# Patient Record
Sex: FEMALE | Race: WHITE | Employment: UNEMPLOYED | RURAL
[De-identification: names, ages, dates, MRNs, and addresses within clinical notes are randomized per-mention and may not be internally consistent; named-entity substitution may affect disease eponyms.]

---

## 2018-06-15 ENCOUNTER — HOSPITAL ENCOUNTER (OUTPATIENT)
Dept: OTHER | Age: 38
Discharge: HOME | End: 2018-06-15
Attending: NURSE PRACTITIONER | Admitting: NURSE PRACTITIONER

## 2018-06-15 LAB
A/G RATIO: 1.8 (ref 0.8–2)
ALBUMIN SERPL-MCNC: 4.3 G/DL (ref 3.4–4.8)
ALP BLD-CCNC: 54 U/L (ref 25–100)
ALT SERPL-CCNC: 14 U/L (ref 4–36)
ANION GAP SERPL CALCULATED.3IONS-SCNC: 12 MMOL/L (ref 3–16)
AST SERPL-CCNC: 19 U/L (ref 8–33)
BASOPHILS ABSOLUTE: 0 K/UL (ref 0–0.1)
BASOPHILS RELATIVE PERCENT: 0.6 %
BILIRUB SERPL-MCNC: 0.4 MG/DL (ref 0.3–1.2)
BUN BLDV-MCNC: 8 MG/DL (ref 6–20)
CALCIUM SERPL-MCNC: 9.3 MG/DL (ref 8.5–10.5)
CHLORIDE BLD-SCNC: 104 MMOL/L (ref 98–107)
CHOLESTEROL, TOTAL: 159 MG/DL (ref 0–200)
CO2: 25 MMOL/L (ref 20–30)
CREAT SERPL-MCNC: 0.7 MG/DL (ref 0.4–1.2)
EOSINOPHILS ABSOLUTE: 0.2 K/UL (ref 0–0.4)
EOSINOPHILS RELATIVE PERCENT: 2.9 %
FOLATE: >20 NG/ML
GFR AFRICAN AMERICAN: >59
GFR NON-AFRICAN AMERICAN: >60
GLOBULIN: 2.4 G/DL
GLUCOSE BLD-MCNC: 104 MG/DL (ref 74–106)
HCT VFR BLD CALC: 43 % (ref 37–47)
HDLC SERPL-MCNC: 57 MG/DL (ref 40–60)
HEMOGLOBIN: 13.9 G/DL (ref 11.5–16.5)
IMMATURE GRANULOCYTES #: 0 K/UL
IMMATURE GRANULOCYTES %: 0.4 % (ref 0–5)
LDL CHOLESTEROL CALCULATED: 89 MG/DL
LYMPHOCYTES ABSOLUTE: 1.7 K/UL (ref 1.5–4)
LYMPHOCYTES RELATIVE PERCENT: 30.5 %
MCH RBC QN AUTO: 29.3 PG (ref 27–32)
MCHC RBC AUTO-ENTMCNC: 32.3 G/DL (ref 31–35)
MCV RBC AUTO: 90.7 FL (ref 80–100)
MONOCYTES ABSOLUTE: 0.3 K/UL (ref 0.2–0.8)
MONOCYTES RELATIVE PERCENT: 5.9 %
NEUTROPHILS ABSOLUTE: 3.3 K/UL (ref 2–7.5)
NEUTROPHILS RELATIVE PERCENT: 59.7 %
PDW BLD-RTO: 12.9 % (ref 11–16)
PLATELET # BLD: 235 K/UL (ref 150–400)
PMV BLD AUTO: 12.8 FL (ref 6–10)
POTASSIUM SERPL-SCNC: 4 MMOL/L (ref 3.4–5.1)
RBC # BLD: 4.74 M/UL (ref 3.8–5.8)
SODIUM BLD-SCNC: 141 MMOL/L (ref 136–145)
TOTAL PROTEIN: 6.7 G/DL (ref 6.4–8.3)
TRIGL SERPL-MCNC: 67 MG/DL (ref 0–249)
TSH SERPL DL<=0.05 MIU/L-ACNC: 2.55 UIU/ML (ref 0.35–5.5)
VITAMIN B-12: 617 PG/ML (ref 211–911)
VLDLC SERPL CALC-MCNC: 13 MG/DL
WBC # BLD: 5.4 K/UL (ref 4–11)

## 2019-07-26 ENCOUNTER — HOSPITAL ENCOUNTER (OUTPATIENT)
Facility: HOSPITAL | Age: 39
Discharge: HOME | End: 2019-07-26
Payer: COMMERCIAL

## 2019-07-26 LAB
A/G RATIO: 2.1 (ref 0.8–2)
ALBUMIN SERPL-MCNC: 4.5 G/DL (ref 3.4–4.8)
ALP BLD-CCNC: 51 U/L (ref 25–100)
ALT SERPL-CCNC: 13 U/L (ref 4–36)
ANION GAP SERPL CALCULATED.3IONS-SCNC: 10 MMOL/L (ref 3–16)
AST SERPL-CCNC: 18 U/L (ref 8–33)
BASOPHILS ABSOLUTE: 0 K/UL (ref 0–0.1)
BASOPHILS RELATIVE PERCENT: 0.8 %
BILIRUB SERPL-MCNC: 0.4 MG/DL (ref 0.3–1.2)
BUN BLDV-MCNC: 6 MG/DL (ref 6–20)
CALCIUM SERPL-MCNC: 9 MG/DL (ref 8.5–10.5)
CHLORIDE BLD-SCNC: 102 MMOL/L (ref 98–107)
CHOLESTEROL, TOTAL: 137 MG/DL (ref 0–200)
CO2: 27 MMOL/L (ref 20–30)
CREAT SERPL-MCNC: 0.6 MG/DL (ref 0.4–1.2)
EOSINOPHILS ABSOLUTE: 0.1 K/UL (ref 0–0.4)
EOSINOPHILS RELATIVE PERCENT: 2.7 %
FOLATE: 19.04 NG/ML
GFR AFRICAN AMERICAN: >59
GFR NON-AFRICAN AMERICAN: >60
GLOBULIN: 2.1 G/DL
GLUCOSE BLD-MCNC: 113 MG/DL (ref 74–106)
HCT VFR BLD CALC: 43.5 % (ref 37–47)
HDLC SERPL-MCNC: 60 MG/DL (ref 40–60)
HEMOGLOBIN: 14.3 G/DL (ref 11.5–16.5)
IMMATURE GRANULOCYTES #: 0 K/UL
IMMATURE GRANULOCYTES %: 0.4 % (ref 0–5)
LDL CHOLESTEROL CALCULATED: 64 MG/DL
LYMPHOCYTES ABSOLUTE: 1.5 K/UL (ref 1.5–4)
LYMPHOCYTES RELATIVE PERCENT: 28.9 %
MCH RBC QN AUTO: 30.1 PG (ref 27–32)
MCHC RBC AUTO-ENTMCNC: 32.9 G/DL (ref 31–35)
MCV RBC AUTO: 91.6 FL (ref 80–100)
MONOCYTES ABSOLUTE: 0.3 K/UL (ref 0.2–0.8)
MONOCYTES RELATIVE PERCENT: 6.6 %
NEUTROPHILS ABSOLUTE: 3.1 K/UL (ref 2–7.5)
NEUTROPHILS RELATIVE PERCENT: 60.6 %
PDW BLD-RTO: 12.6 % (ref 11–16)
PLATELET # BLD: 244 K/UL (ref 150–400)
PMV BLD AUTO: 12.3 FL (ref 6–10)
POTASSIUM SERPL-SCNC: 4.2 MMOL/L (ref 3.4–5.1)
RBC # BLD: 4.75 M/UL (ref 3.8–5.8)
SODIUM BLD-SCNC: 139 MMOL/L (ref 136–145)
TOTAL PROTEIN: 6.6 G/DL (ref 6.4–8.3)
TRIGL SERPL-MCNC: 64 MG/DL (ref 0–249)
TSH SERPL DL<=0.05 MIU/L-ACNC: 1.17 UIU/ML (ref 0.35–5.5)
VITAMIN B-12: 529 PG/ML (ref 211–911)
VLDLC SERPL CALC-MCNC: 13 MG/DL
WBC # BLD: 5.2 K/UL (ref 4–11)

## 2019-07-26 PROCEDURE — 80053 COMPREHEN METABOLIC PANEL: CPT

## 2019-07-26 PROCEDURE — 84443 ASSAY THYROID STIM HORMONE: CPT

## 2019-07-26 PROCEDURE — 82607 VITAMIN B-12: CPT

## 2019-07-26 PROCEDURE — 85025 COMPLETE CBC W/AUTO DIFF WBC: CPT

## 2019-07-26 PROCEDURE — 36415 COLL VENOUS BLD VENIPUNCTURE: CPT

## 2019-07-26 PROCEDURE — 82746 ASSAY OF FOLIC ACID SERUM: CPT

## 2019-07-26 PROCEDURE — 80061 LIPID PANEL: CPT

## 2020-09-01 ENCOUNTER — HOSPITAL ENCOUNTER (EMERGENCY)
Facility: HOSPITAL | Age: 40
Discharge: HOME | End: 2020-09-01
Attending: EMERGENCY MEDICINE
Payer: COMMERCIAL

## 2020-09-01 ENCOUNTER — APPOINTMENT (OUTPATIENT)
Dept: ULTRASOUND IMAGING | Facility: HOSPITAL | Age: 40
End: 2020-09-01
Payer: COMMERCIAL

## 2020-09-01 VITALS
WEIGHT: 130 LBS | OXYGEN SATURATION: 100 % | TEMPERATURE: 97.3 F | SYSTOLIC BLOOD PRESSURE: 117 MMHG | RESPIRATION RATE: 16 BRPM | HEART RATE: 97 BPM | DIASTOLIC BLOOD PRESSURE: 82 MMHG | BODY MASS INDEX: 20.4 KG/M2 | HEIGHT: 67 IN

## 2020-09-01 LAB
A/G RATIO: 1.1 (ref 0.8–2)
ALBUMIN SERPL-MCNC: 3.7 G/DL (ref 3.4–4.8)
ALP BLD-CCNC: 77 U/L (ref 25–100)
ALT SERPL-CCNC: 23 U/L (ref 4–36)
ANION GAP SERPL CALCULATED.3IONS-SCNC: 11 MMOL/L (ref 3–16)
AST SERPL-CCNC: 29 U/L (ref 8–33)
BASOPHILS ABSOLUTE: 0 K/UL (ref 0–0.1)
BASOPHILS RELATIVE PERCENT: 0.5 %
BILIRUB SERPL-MCNC: 0.8 MG/DL (ref 0.3–1.2)
BUN BLDV-MCNC: 9 MG/DL (ref 6–20)
CALCIUM SERPL-MCNC: 8.7 MG/DL (ref 8.5–10.5)
CHLORIDE BLD-SCNC: 99 MMOL/L (ref 98–107)
CO2: 24 MMOL/L (ref 20–30)
CREAT SERPL-MCNC: <0.5 MG/DL (ref 0.4–1.2)
EOSINOPHILS ABSOLUTE: 0.1 K/UL (ref 0–0.4)
EOSINOPHILS RELATIVE PERCENT: 0.9 %
GFR AFRICAN AMERICAN: >59
GFR NON-AFRICAN AMERICAN: >60
GLOBULIN: 3.3 G/DL
GLUCOSE BLD-MCNC: 97 MG/DL (ref 74–106)
HCT VFR BLD CALC: 41.3 % (ref 37–47)
HEMOGLOBIN: 13.9 G/DL (ref 11.5–16.5)
IMMATURE GRANULOCYTES #: 0 K/UL
IMMATURE GRANULOCYTES %: 0.3 % (ref 0–5)
LYMPHOCYTES ABSOLUTE: 1 K/UL (ref 1.5–4)
LYMPHOCYTES RELATIVE PERCENT: 15 %
MAGNESIUM: 2.2 MG/DL (ref 1.7–2.4)
MCH RBC QN AUTO: 30 PG (ref 27–32)
MCHC RBC AUTO-ENTMCNC: 33.7 G/DL (ref 31–35)
MCV RBC AUTO: 89.2 FL (ref 80–100)
MONOCYTES ABSOLUTE: 0.7 K/UL (ref 0.2–0.8)
MONOCYTES RELATIVE PERCENT: 11.1 %
NEUTROPHILS ABSOLUTE: 4.8 K/UL (ref 2–7.5)
NEUTROPHILS RELATIVE PERCENT: 72.2 %
PDW BLD-RTO: 12.2 % (ref 11–16)
PLATELET # BLD: 133 K/UL (ref 150–400)
PMV BLD AUTO: 11.8 FL (ref 6–10)
POTASSIUM REFLEX MAGNESIUM: 3.2 MMOL/L (ref 3.4–5.1)
PRO-BNP: 373 PG/ML (ref 0–900)
RBC # BLD: 4.63 M/UL (ref 3.8–5.8)
SODIUM BLD-SCNC: 134 MMOL/L (ref 136–145)
TOTAL PROTEIN: 7 G/DL (ref 6.4–8.3)
TROPONIN: <0.3 NG/ML
TSH REFLEX: 1.31 UIU/ML (ref 0.35–5.5)
WBC # BLD: 6.7 K/UL (ref 4–11)

## 2020-09-01 PROCEDURE — 99283 EMERGENCY DEPT VISIT LOW MDM: CPT

## 2020-09-01 PROCEDURE — 6370000000 HC RX 637 (ALT 250 FOR IP): Performed by: EMERGENCY MEDICINE

## 2020-09-01 PROCEDURE — 84484 ASSAY OF TROPONIN QUANT: CPT

## 2020-09-01 PROCEDURE — 84443 ASSAY THYROID STIM HORMONE: CPT

## 2020-09-01 PROCEDURE — 83735 ASSAY OF MAGNESIUM: CPT

## 2020-09-01 PROCEDURE — 36415 COLL VENOUS BLD VENIPUNCTURE: CPT

## 2020-09-01 PROCEDURE — 83880 ASSAY OF NATRIURETIC PEPTIDE: CPT

## 2020-09-01 PROCEDURE — 80053 COMPREHEN METABOLIC PANEL: CPT

## 2020-09-01 PROCEDURE — 93970 EXTREMITY STUDY: CPT

## 2020-09-01 PROCEDURE — 99282 EMERGENCY DEPT VISIT SF MDM: CPT

## 2020-09-01 PROCEDURE — 85025 COMPLETE CBC W/AUTO DIFF WBC: CPT

## 2020-09-01 RX ORDER — IBUPROFEN 400 MG/1
400 TABLET ORAL EVERY 6 HOURS PRN
COMMUNITY

## 2020-09-01 RX ORDER — METHYLPREDNISOLONE 4 MG/1
TABLET ORAL
Qty: 1 KIT | Refills: 0 | Status: SHIPPED | OUTPATIENT
Start: 2020-09-01 | End: 2020-09-07

## 2020-09-01 RX ORDER — CEPHALEXIN 500 MG/1
1000 CAPSULE ORAL 2 TIMES DAILY
Qty: 40 CAPSULE | Refills: 0 | Status: SHIPPED | OUTPATIENT
Start: 2020-09-01 | End: 2020-09-11

## 2020-09-01 RX ORDER — POTASSIUM CHLORIDE 20 MEQ/1
20 TABLET, EXTENDED RELEASE ORAL ONCE
Status: COMPLETED | OUTPATIENT
Start: 2020-09-01 | End: 2020-09-01

## 2020-09-01 RX ADMIN — POTASSIUM CHLORIDE 20 MEQ: 1500 TABLET, EXTENDED RELEASE ORAL at 14:50

## 2020-09-01 SDOH — HEALTH STABILITY: MENTAL HEALTH: HOW OFTEN DO YOU HAVE A DRINK CONTAINING ALCOHOL?: NEVER

## 2020-09-01 NOTE — ED TRIAGE NOTES
Patient to ED with complaints of leg swelling. Patient has +3 nonpitting to lower extremities. Patient has large red hives noted to bilateral lower extremities and right arm. Denies pain or itching. Denies any outdoor or sick contacts.

## 2020-09-01 NOTE — ED PROVIDER NOTES
[address] [facility] ENCOUNTER      Pt Name: Jl Moreland  MRN: [id number]  YOB: 1980  Date of evaluation: 9/1/2020  Provider: Elías Muñoz MD    CHIEF COMPLAINT       Chief Complaint   Patient presents with    Leg Swelling    Rash         HISTORY OF PRESENT ILLNESS  (Location/Symptom, Timing/Onset, Context/Setting, Quality, Duration, Modifying Factors, Severity.)   Jl Moreland is a 47 y.o. female who presents to the emergency department by private vehicle complaining of rash and swelling in both lower extremities for the past 3 days. She also has a rash on the upper extremities. No fever chills chest pain or difficulty breathing. No one else has this. She has never had this problem before. Denies any chronic medical problems. Denies pregnancy. Denies alcohol or substance abuse. Nursing notes were reviewed. REVIEW OFSYSTEMS    (2-9 systems for level 4, 10 or more for level 5)   ROS:  General:  No fevers, no chills, no weakness  Cardiovascular:  No chest pain, no palpitations  Respiratory:  No shortness of breath, no cough, no wheezing  Gastrointestinal:  No pain, no nausea, no vomiting, no diarrhea  Musculoskeletal:  No muscle pain, no joint pain  Skin:  + rash, no easy bruising  Neurologic:  No speech problems, no headache, no extremity weakness  Psychiatric:  No anxiety  Genitourinary:  No dysuria, no hematuria    Except as noted above the remainder of the review of systems was reviewed and negative. PAST MEDICAL HISTORY   History reviewed. No pertinent past medical history. SURGICAL HISTORY       Past Surgical History:   Procedure Laterality Date    TYMPANOSTOMY TUBE PLACEMENT           CURRENT MEDICATIONS       Previous Medications    IBUPROFEN (ADVIL;MOTRIN) 400 MG TABLET    Take 400 mg by mouth every 6 hours as needed for Pain       ALLERGIES     Patient has no known allergies.     FAMILY HISTORY     History reviewed. No pertinent family history. SOCIAL HISTORY       Social History     Socioeconomic History    Marital status: Single     Spouse name: None    Number of children: None    Years of education: None    Highest education level: None   Occupational History    None   Social Needs    Financial resource strain: None    Food insecurity     Worry: None     Inability: None    Transportation needs     Medical: None     Non-medical: None   Tobacco Use    Smoking status: Never Smoker    Smokeless tobacco: Never Used   Substance and Sexual Activity    Alcohol use: Never     Frequency: Never    Drug use: None    Sexual activity: None   Lifestyle    Physical activity     Days per week: None     Minutes per session: None    Stress: None   Relationships    Social connections     Talks on phone: None     Gets together: None     Attends [employer] service: None     Active member of club or organization: None     Attends meetings of clubs or organizations: None     Relationship status: None    Intimate partner violence     Fear of current or ex partner: None     Emotionally abused: None     Physically abused: None     Forced sexual activity: None   Other Topics Concern    None   Social History Narrative    None         PHYSICAL EXAM    (up to 7 for level 4, 8 or more for level 5)     ED Triage Vitals [09/01/20 1250]   BP Temp Temp Source Pulse Resp SpO2 Height Weight   131/85 97.3 °F (36.3 °C) Oral 106 16 100 % 5' 7\" (1.702 m) 130 lb (59 kg)       Physical Exam  General :Patient is awake, alert, oriented, in no acute distress, nontoxic appearing  HEENT: Pupils are equally round and reactive to light, EOMI, conjunctivae clear. Oral mucosa is moist, no exudate. Uvula is midline  Neck: Neck is supple, full range of motion, trachea midline  Cardiac: Heart regular rate, rhythm, no murmurs, rubs, or gallops  Lungs: Lungs are clear to auscultation, there is no wheezing, rhonchi, or rales.  There is no use of accessory muscles. Chest wall: There is no tenderness to palpation over the chest wall or over ribs  Abdomen: Abdomen is soft, nontender, nondistended. There is no firm or pulsatile masses, no rebound rigidity or guarding. Musculoskeletal: 5 out of 5 strength in all 4 extremities. No focal muscle deficits are appreciated  Neuro: Motor intact, sensory intact, level of consciousness is normal, cerebellar function is normal, reflexes are grossly normal. No evidence of incontinence or loss of bowel or bladder function, no saddle anesthesia noted   Dermatology: Skin is warm and dry  Psych: Mentation is grossly normal, cognition is grossly normal. Affect is appropriate. In the upper extremities she has sparsely distributed papules and pustules. Few wheals with central puncta. Both lower extremities are swollen. Multiple blanching erythematous wheals. Distal sensation and circulation is intact. DIAGNOSTIC RESULTS     EKG: All EKG's are interpreted by the Emergency Department Physician who either signs or Co-signs this chart in the absenceof a cardiologist.    The EKG interpreted by me shows normal sinus rhythm with nonspecific ST-T changes. RADIOLOGY:   Non-plain film images such as CT, Ultrasound and MRI are read by the radiologist. Plain radiographic images are visualized and preliminarily interpreted by the emergency physician with the below findings:      ? Radiologist's Report Reviewed:  US DUP LOWER EXTREMITIES BILATERAL VENOUS   Final Result      1. No evidence of deep vein thrombosis in the bilateral lower extremity with normal flow, waveforms, compressibility and distal augmentation documented.                 ED BEDSIDE ULTRASOUND:   Performed by ED Physician - none    LABS:    I have reviewed and interpreted all of the currently available lab results from this visit (ifapplicable):  Results for orders placed or performed during the hospital encounter of 09/01/20   CBC Auto Differential   Result Value Ref Range    WBC 6.7 4.0 - 11.0 K/uL    RBC 4.63 3.80 - 5.80 M/uL    Hemoglobin 13.9 11.5 - 16.5 g/dL    Hematocrit 41.3 37.0 - 47.0 %    MCV 89.2 80.0 - 100.0 fL    MCH 30.0 27.0 - 32.0 pg    MCHC 33.7 31.0 - 35.0 g/dL    RDW 12.2 11.0 - 16.0 %    Platelets 583 (L) 118 - 400 K/uL    MPV 11.8 (H) 6.0 - 10.0 fL    Neutrophils % 72.2 %    Immature Granulocytes % 0.3 0.0 - 5.0 %    Lymphocytes % 15.0 %    Monocytes % 11.1 %    Eosinophils % 0.9 %    Basophils % 0.5 %    Neutrophils Absolute 4.8 2.0 - 7.5 K/uL    Immature Granulocytes # 0.0 K/uL    Lymphocytes Absolute 1.0 (L) 1.5 - 4.0 K/uL    Monocytes Absolute 0.7 0.2 - 0.8 K/uL    Eosinophils Absolute 0.1 0.0 - 0.4 K/uL    Basophils Absolute 0.0 0.0 - 0.1 K/uL   Comprehensive Metabolic Panel w/ Reflex to MG   Result Value Ref Range    Sodium 134 (L) 136 - 145 mmol/L    Potassium reflex Magnesium 3.2 (L) 3.4 - 5.1 mmol/L    Chloride 99 98 - 107 mmol/L    CO2 24 20 - 30 mmol/L    Anion Gap 11 3 - 16    Glucose 97 74 - 106 mg/dL    BUN 9 6 - 20 mg/dL    CREATININE <0.5 0.4 - 1.2 mg/dL    GFR Non-African American >60 >59    GFR African American >59 >59    Calcium 8.7 8.5 - 10.5 mg/dL    Total Protein 7.0 6.4 - 8.3 g/dL    Alb 3.7 3.4 - 4.8 g/dL    Albumin/Globulin Ratio 1.1 0.8 - 2.0    Total Bilirubin 0.8 0.3 - 1.2 mg/dL    Alkaline Phosphatase 77 25 - 100 U/L    ALT 23 4 - 36 U/L    AST 29 8 - 33 U/L    Globulin 3.3 g/dL   Troponin   Result Value Ref Range    Troponin <0.30 <0.30 ng/mL   Brain Natriuretic Peptide   Result Value Ref Range    Pro- 0 - 900 pg/mL   TSH with Reflex   Result Value Ref Range    TSH 1.31 0.35 - 5.50 uIU/mL   Magnesium   Result Value Ref Range    Magnesium 2.2 1.7 - 2.4 mg/dL        All other labs were within normal range or not returned as of this dictation.     EMERGENCY DEPARTMENT COURSE and DIFFERENTIAL DIAGNOSIS/MDM:   Vitals:    Vitals:    09/01/20 1250   BP: 131/85   Pulse: 106   Resp: 16   Temp: 97.3 °F (36.3 °C)   TempSrc: Oral   SpO2: 100%   Weight: 130 lb (59 kg)   Height: 5' 7\" (1.702 m)       MEDICATIONS ADMINISTERED IN ED:  Medications - No data to display    1512  No new complaints. Discussed all test results and differential diagnosis. She is mildly hypokalemic. We will give her a dose of potassium. Mildly thrombocytopenic. BNP and TSH normal.  Ultrasound was negative for DVT. Rash most likely is due to insect bites. Some of which may be infected. We will give her Keflex for that. Because of the extent of the rash I do not think topical medications is appropriate. We will write a prescription for Medrol Dosepak. She may take Benadryl as needed for itching. Advised to follow-up tomorrow. Return to the ED if worse. Although she is mildly thrombocytopenic the rash is not purpuric. I do not think she has ITP. The patient will follow-up with their PCP in 1-2 days for reevaluation. If the patient or family members have anyfurther concerns or any worsening symptoms they will return to the ED for reevaluation. CONSULTS:  None    PROCEDURES:  Procedures    CRITICAL CARE TIME    Total Critical Care time was 0 minutes, excluding separately reportable procedures. There was a high probability of clinically significant/life threatening deterioration in the patient's condition which required my urgent intervention. FINAL IMPRESSION      1. Rash and other nonspecific skin eruption    2. Hypokalemia    3.  Thrombocytopenia ([address]    4. Elevated BP without diagnosis of hypertension          DISPOSITION/PLAN   DISPOSITION        PATIENT REFERRED TO:  JAMAAL Aguilar - MARTIN  [address]  [phone]    In 1 day        DISCHARGE MEDICATIONS:  Discharge Medication List as of 9/1/2020  3:17 PM      START taking these medications    Details   cephALEXin (KEFLEX) 500 MG capsule Take 2 capsules by mouth 2 times daily for 10 days, Disp-40 capsule,R-0Print      methylPREDNISolone (MEDROL DOSEPACK) 4 MG tablet Take by mouth., Disp-1 kit,R-0Print             Comment: Please note this report has been produced using speech recognition software and may contain errorsrelated to that system including errors in grammar, punctuation, and spelling, as well as words and phrases that may be inappropriate. If there are any questions or concerns please feel free to contact the dictating providerfor clarification.     Arash Nj MD  Attending Emergency Physician             Arash Nj MD  09/01/20 5265

## 2020-09-13 ENCOUNTER — HOSPITAL ENCOUNTER (EMERGENCY)
Facility: HOSPITAL | Age: 40
Discharge: HOME | End: 2020-09-13
Attending: EMERGENCY MEDICINE
Payer: COMMERCIAL

## 2020-09-13 VITALS
HEART RATE: 90 BPM | DIASTOLIC BLOOD PRESSURE: 71 MMHG | OXYGEN SATURATION: 100 % | TEMPERATURE: 98.3 F | WEIGHT: 130 LBS | HEIGHT: 65 IN | SYSTOLIC BLOOD PRESSURE: 130 MMHG | RESPIRATION RATE: 16 BRPM | BODY MASS INDEX: 21.66 KG/M2

## 2020-09-13 LAB
BILIRUBIN URINE: NEGATIVE
BLOOD, URINE: NEGATIVE
CLARITY: CLEAR
COLOR: YELLOW
GLUCOSE URINE: NEGATIVE MG/DL
HCG(URINE) PREGNANCY TEST: NEGATIVE
KETONES, URINE: NEGATIVE MG/DL
LEUKOCYTE ESTERASE, URINE: NEGATIVE
MICROSCOPIC EXAMINATION: NORMAL
NITRITE, URINE: NEGATIVE
PH UA: 5.5 (ref 5–8)
PROTEIN UA: NEGATIVE MG/DL
SPECIFIC GRAVITY UA: 1.02 (ref 1–1.03)
URINE REFLEX TO CULTURE: NORMAL
URINE TYPE: NORMAL
UROBILINOGEN, URINE: 1 E.U./DL

## 2020-09-13 PROCEDURE — 84703 CHORIONIC GONADOTROPIN ASSAY: CPT

## 2020-09-13 PROCEDURE — 81003 URINALYSIS AUTO W/O SCOPE: CPT

## 2020-09-13 PROCEDURE — 99282 EMERGENCY DEPT VISIT SF MDM: CPT

## 2020-09-13 RX ORDER — DOXYCYCLINE 100 MG/1
100 TABLET ORAL 2 TIMES DAILY
Qty: 20 TABLET | Refills: 0 | Status: SHIPPED | OUTPATIENT
Start: 2020-09-13 | End: 2020-09-23

## 2020-09-13 ASSESSMENT — PAIN DESCRIPTION - ORIENTATION: ORIENTATION: LEFT;RIGHT

## 2020-09-13 ASSESSMENT — PAIN DESCRIPTION - LOCATION: LOCATION: FOOT

## 2020-09-13 ASSESSMENT — PAIN SCALES - GENERAL: PAINLEVEL_OUTOF10: 4

## 2020-09-13 ASSESSMENT — PAIN DESCRIPTION - DESCRIPTORS: DESCRIPTORS: ACHING

## 2020-09-13 ASSESSMENT — PAIN DESCRIPTION - FREQUENCY: FREQUENCY: CONTINUOUS

## 2020-09-13 NOTE — ED PROVIDER NOTES
[facility] ENCOUNTER      Pt Name: Jocelyn Kessler  MRN: [id number]  [place] 1980  Date of evaluation: 9/13/2020  Provider: Elisha Ortega DO    CHIEF COMPLAINT       Chief Complaint   Patient presents with    Foot Swelling         HISTORY OF PRESENT ILLNESS   (Location/Symptom, Timing/Onset, Context/Setting, Quality, Duration, Modifying Factors, Severity)  Note limiting factors. Jocelyn Kessler is a 47 y.o. female who presents to the emergency department with complaint of foot swelling. Patient was just seen on September 1 for leg swelling. She had a full work-up at that time showing a negative DVT ultrasound study bilaterally. Her BNP was normal.  She is mildly thrombocytopenic and hypokalemic. There is also concern for an infected insect bite which patient was treated with Keflex. Because of the extent of the rash as well as the pruritus she was treated with a Medrol Dosepak as well as Benadryl. She reports the swelling improved on the left side but still present on the right side. Has some redness in the area as well as a rash over her anterior shin. She is unsure what kind of insects she was bit by and unsure whether there was a tick bite either. She denies fever, night sweats, chills, shortness of breath, history of blood clots or recent travel. Reports has been trying to keep her legs elevated. Appropriate PPE was used including an eye shield, gloves, N95 mask, surgical mask during the entire patient encounter and exam.  If necessary (pt being intubated or aerosolizing equipment in use) a gown was also used. Nursing Notes were reviewed. PAST MEDICAL HISTORY   History reviewed. No pertinent past medical history.       SURGICAL HISTORY       Past Surgical History:   Procedure Laterality Date    TYMPANOSTOMY TUBE PLACEMENT           CURRENT MEDICATIONS       Previous Medications    IBUPROFEN (ADVIL;MOTRIN) 400 MG TABLET    Take 400 to 7 for level 4, 8 or more for level 5)     ED Triage Vitals   BP Temp Temp src Pulse Resp SpO2 Height Weight   -- -- -- -- -- -- -- --       General appearance: well-developed, well-nourished, no acute distress, nontoxic appearance  HENT: normocephalic, atraumatic, oropharynx moist, nares patent  Eyes: PERRLA, EOMI, conjunctiva normal  Neck: normal range of motion, no tenderness, trachea midline, no stridor  Respiratory: normal breath sounds, non labored breathing pattern  Cardiovascular: normal heart rate, normal rhythm  GI: nontender, bowel sounds normal, soft, nondistended, no pulsatile masses  Musculoskeletal: 2+ pitting edema on the right lower extremity extending into the foot, mild erythema noted over the medial portion of the right foot with no tenderness to palpation or warmth to touch, full range of motion the ankle with dorsiflexion and plantarflexion, also with some circular nonblanching erythematous lesions noted over the anterior shin, no obvious signs of erythema migrans, 1+ pitting edema in the left lower extremity with no overlying erythema, 2+ DP pulses bilaterally, intact distal pulses, no clubbing, cyanosis.  Good range of motion  Integument: warm, dry, no rash, < 2 second cap refill  Neurologic: alert and oriented ×3, no focal deficits appreciated    DIAGNOSTIC RESULTS         RADIOLOGY:   Interpretation per the Radiologist below, if available at the time of this note:    US DUP LOWER EXTREMITY RIGHT SAMM    (Results Pending)         LABS:  Labs Reviewed   URINE RT REFLEX TO CULTURE    Narrative:     Performed at:  [address] [facility]  [facility], [place] [address]   Phone [phone]   PREGNANCY, URINE    Narrative:     Performed at:  [address] [facility]  [address] [facility], [address]   Phone [phone]       All other labs were within normal range or not returned as of this dictation. EMERGENCY DEPARTMENT COURSE and DIFFERENTIAL DIAGNOSIS/MDM:   Vitals:    Vitals:    09/13/20 1501   BP: 116/79   Pulse: 99   Resp: 18   Temp: 98.3 °F (36.8 °C)   TempSrc: Oral   SpO2: 100%   Weight: 130 lb (59 kg)   Height: 5' 5\" (1.651 m)         MDM  44-year-old female presents the emergency department with complaint of bilateral lower extremity edema worse on the right. Already had a pretty full work-up with negative DVT studies and evaluation for heart failure on 09/01. She is alert and awake and overall appears nontoxic. She denies any shortness of breath or signs or symptoms of congestive heart failure. No risk factors for DVT. Unsure whether she has bit by a tick but there is a circular rash that not consistent with a target sign on the right anterior shin. At this point will obtain baseline blood work to evaluate for proteinuria, nephrotic syndrome. Patient was a difficult stick. Does not want any further blood draws. Looking back at past labs from the first her albumin was normal.  Normal kidney function. BNP was normal.  No signs of congestive heart failure. Urinalysis today shows no proteinuria. No signs of nephrotic syndrome. She does have mild erythema as well as an erythematous harman on the anterior shin. Concern with recent insect bites that possibly a tick bite. Will treat with doxycycline. We will also give repeat Doppler ultrasound of the right lower extremity as an outpatient. Do not believe she warrants anticoagulation as she Josue had a negative DVT study and is otherwise low risk. Instructed her to follow-up with a primary care doctor in 1 to 2 days and return to the emergency department if any new or concerning symptoms arise. Patient agrees with discharge plan. She should use Ace wrap for compression. As well as elevate her leg as much as possible. CONSULTS:  None      FINAL IMPRESSION      1.  Right leg swelling          DISPOSITION/PLAN   DISPOSITION PATIENT REFERRED TO:  JAMAAL White - CNP  [address] [place] [id number]  [phone]    Schedule an appointment as soon as possible for a visit in 2 days  As needed, If symptoms worsen      DISCHARGE MEDICATIONS:  New Prescriptions    DOXYCYCLINE MONOHYDRATE (ADOXA) 100 MG TABLET    Take 1 tablet by mouth 2 times daily for 10 days          (Please note that portions of this note were completed with a voice recognition program.  Efforts were made to edit the dictations but occasionally words are mis-transcribed.)    Modesto Dugan DO (electronically signed)  Attending Emergency Physician      Modesto Dugan DO  09/13/20 153

## 2020-09-13 NOTE — ED NOTES
Discharge instructions reviewed with pt and understanding verbalized, no further needs or concerns at this time. Pt ambulated out of ED without difficulty.  Patient verbalizes understanding of scheduled ultrasound appointment tomorrow (9/13) at 3 pm.      Torrie Knutson RN  09/13/20 1303 Vannesa Shipman RN  09/13/20 4515

## 2020-09-13 NOTE — ED NOTES
PT assisted to restroom to provide urine sample. PT instructed on how to provide a clean catch urine sample.       Harvinder Ng  09/13/20 2113

## 2020-09-17 ENCOUNTER — HOSPITAL ENCOUNTER (EMERGENCY)
Facility: HOSPITAL | Age: 40
Discharge: HOME | End: 2020-09-17
Attending: EMERGENCY MEDICINE
Payer: COMMERCIAL

## 2020-09-17 ENCOUNTER — APPOINTMENT (OUTPATIENT)
Dept: GENERAL RADIOLOGY | Facility: HOSPITAL | Age: 40
End: 2020-09-17
Payer: COMMERCIAL

## 2020-09-17 ENCOUNTER — APPOINTMENT (OUTPATIENT)
Dept: ULTRASOUND IMAGING | Facility: HOSPITAL | Age: 40
End: 2020-09-17
Payer: COMMERCIAL

## 2020-09-17 VITALS
DIASTOLIC BLOOD PRESSURE: 69 MMHG | HEIGHT: 66 IN | SYSTOLIC BLOOD PRESSURE: 111 MMHG | RESPIRATION RATE: 18 BRPM | WEIGHT: 130 LBS | TEMPERATURE: 98.1 F | OXYGEN SATURATION: 100 % | HEART RATE: 93 BPM | BODY MASS INDEX: 20.89 KG/M2

## 2020-09-17 LAB
A/G RATIO: 1.1 (ref 0.8–2)
ALBUMIN SERPL-MCNC: 3.9 G/DL (ref 3.4–4.8)
ALP BLD-CCNC: 88 U/L (ref 25–100)
ALT SERPL-CCNC: 20 U/L (ref 4–36)
ANION GAP SERPL CALCULATED.3IONS-SCNC: 10 MMOL/L (ref 3–16)
AST SERPL-CCNC: 24 U/L (ref 8–33)
BASOPHILS ABSOLUTE: 0 K/UL (ref 0–0.1)
BASOPHILS RELATIVE PERCENT: 0.5 %
BILIRUB SERPL-MCNC: 0.6 MG/DL (ref 0.3–1.2)
BILIRUBIN URINE: NEGATIVE
BLOOD, URINE: NEGATIVE
BUN BLDV-MCNC: 7 MG/DL (ref 6–20)
CALCIUM SERPL-MCNC: 9.3 MG/DL (ref 8.5–10.5)
CHLORIDE BLD-SCNC: 95 MMOL/L (ref 98–107)
CLARITY: CLEAR
CO2: 27 MMOL/L (ref 20–30)
COLOR: YELLOW
CREAT SERPL-MCNC: 0.6 MG/DL (ref 0.4–1.2)
EOSINOPHILS ABSOLUTE: 0.1 K/UL (ref 0–0.4)
EOSINOPHILS RELATIVE PERCENT: 1 %
GFR AFRICAN AMERICAN: >59
GFR NON-AFRICAN AMERICAN: >60
GLOBULIN: 3.5 G/DL
GLUCOSE BLD-MCNC: 99 MG/DL (ref 74–106)
GLUCOSE URINE: NEGATIVE MG/DL
HCT VFR BLD CALC: 38.3 % (ref 37–47)
HEMOGLOBIN: 12.6 G/DL (ref 11.5–16.5)
IMMATURE GRANULOCYTES #: 0.1 K/UL
IMMATURE GRANULOCYTES %: 0.6 % (ref 0–5)
KETONES, URINE: NEGATIVE MG/DL
LEUKOCYTE ESTERASE, URINE: NEGATIVE
LIPASE: 28 U/L (ref 5.6–51.3)
LYMPHOCYTES ABSOLUTE: 1.4 K/UL (ref 1.5–4)
LYMPHOCYTES RELATIVE PERCENT: 17 %
MAGNESIUM: 2.1 MG/DL (ref 1.7–2.4)
MCH RBC QN AUTO: 29.5 PG (ref 27–32)
MCHC RBC AUTO-ENTMCNC: 32.9 G/DL (ref 31–35)
MCV RBC AUTO: 89.7 FL (ref 80–100)
MICROSCOPIC EXAMINATION: NORMAL
MONOCYTES ABSOLUTE: 0.7 K/UL (ref 0.2–0.8)
MONOCYTES RELATIVE PERCENT: 8.2 %
NEUTROPHILS ABSOLUTE: 5.8 K/UL (ref 2–7.5)
NEUTROPHILS RELATIVE PERCENT: 72.7 %
NITRITE, URINE: NEGATIVE
PDW BLD-RTO: 12.8 % (ref 11–16)
PH UA: 7 (ref 5–8)
PLATELET # BLD: 332 K/UL (ref 150–400)
PMV BLD AUTO: 11.1 FL (ref 6–10)
POTASSIUM REFLEX MAGNESIUM: 3.2 MMOL/L (ref 3.4–5.1)
PRO-BNP: 157 PG/ML (ref 0–900)
PROTEIN UA: NEGATIVE MG/DL
RBC # BLD: 4.27 M/UL (ref 3.8–5.8)
SODIUM BLD-SCNC: 132 MMOL/L (ref 136–145)
SPECIFIC GRAVITY UA: 1.01 (ref 1–1.03)
TOTAL PROTEIN: 7.4 G/DL (ref 6.4–8.3)
TROPONIN: <0.3 NG/ML
URINE REFLEX TO CULTURE: NORMAL
URINE TYPE: NORMAL
UROBILINOGEN, URINE: 0.2 E.U./DL
WBC # BLD: 8 K/UL (ref 4–11)

## 2020-09-17 PROCEDURE — 83735 ASSAY OF MAGNESIUM: CPT

## 2020-09-17 PROCEDURE — 71045 X-RAY EXAM CHEST 1 VIEW: CPT

## 2020-09-17 PROCEDURE — 81003 URINALYSIS AUTO W/O SCOPE: CPT

## 2020-09-17 PROCEDURE — 80053 COMPREHEN METABOLIC PANEL: CPT

## 2020-09-17 PROCEDURE — 36415 COLL VENOUS BLD VENIPUNCTURE: CPT

## 2020-09-17 PROCEDURE — 96374 THER/PROPH/DIAG INJ IV PUSH: CPT

## 2020-09-17 PROCEDURE — 99282 EMERGENCY DEPT VISIT SF MDM: CPT

## 2020-09-17 PROCEDURE — 83690 ASSAY OF LIPASE: CPT

## 2020-09-17 PROCEDURE — 85025 COMPLETE CBC W/AUTO DIFF WBC: CPT

## 2020-09-17 PROCEDURE — 93970 EXTREMITY STUDY: CPT

## 2020-09-17 PROCEDURE — 93005 ELECTROCARDIOGRAM TRACING: CPT

## 2020-09-17 PROCEDURE — 83880 ASSAY OF NATRIURETIC PEPTIDE: CPT

## 2020-09-17 PROCEDURE — 6370000000 HC RX 637 (ALT 250 FOR IP): Performed by: EMERGENCY MEDICINE

## 2020-09-17 PROCEDURE — 6360000002 HC RX W HCPCS: Performed by: EMERGENCY MEDICINE

## 2020-09-17 PROCEDURE — 84484 ASSAY OF TROPONIN QUANT: CPT

## 2020-09-17 RX ORDER — POTASSIUM CHLORIDE 20 MEQ/1
40 TABLET, EXTENDED RELEASE ORAL ONCE
Status: COMPLETED | OUTPATIENT
Start: 2020-09-17 | End: 2020-09-17

## 2020-09-17 RX ORDER — FUROSEMIDE 10 MG/ML
60 INJECTION INTRAMUSCULAR; INTRAVENOUS ONCE
Status: COMPLETED | OUTPATIENT
Start: 2020-09-17 | End: 2020-09-17

## 2020-09-17 RX ADMIN — FUROSEMIDE 60 MG: 10 INJECTION, SOLUTION INTRAVENOUS at 01:30

## 2020-09-17 RX ADMIN — POTASSIUM CHLORIDE 40 MEQ: 20 TABLET, EXTENDED RELEASE ORAL at 03:33

## 2020-09-17 NOTE — ED TRIAGE NOTES
Pt to ed vis wc r/t angel legs swelling and hurting. Pt states has been going on since before 9/1, pt has numerous bug bites also noted to legs. Pt alert and oriented, p/w/d, has been seen twice before and given abx.  States swelling comes and goes

## 2020-09-17 NOTE — ED NOTES
Pt xochitl iv access, iv meds given, bedside commode provided. Pt taken to us per eduardo.       Elizabet Gonzalez RN  09/17/20 4026

## 2020-09-17 NOTE — ED PROVIDER NOTES
62  ENCOUNTER      Pt Name: Fabi Hughes  MRN: [id number]  YOB: 1980  Date of evaluation: 9/17/2020  Provider: Oxana Jules MD    [address] [place] [address]       Chief Complaint   Patient presents with    Leg Swelling         HISTORY OF PRESENT ILLNESS  (Location/Symptom, Timing/Onset, Context/Setting, Quality, Duration, Modifying Factors, Severity.)   Fabi Hughes is a 47 y.o. female who presents to the emergency department with concerns of intermittent leg swelling right greater than left. Patient's been seen already 2 times in the emergency department for leg swelling and was noted to have recently cellulitis of the right lower extremity was placed on doxycycline 100 mg p.o. twice daily. Patient states that she has been taking her medications as prescribed. She states that she tried to call her primary physician multiple times but was unable to to get through to schedule an appointment. Patient was scheduled to the emergency department and outpatient ultrasound scheduled but states that she was unable to get to it because of problems with transportation. Patient states as long as she elevates her legs her swelling goes down but today she did minimal swelling and she is noted marked increase in swelling of her bilateral lower extremities right greater than left and was concerned that she may have a blood clot in her right leg so she presented to the emergency department for further evaluation. Patient denies any chest pain shortness of breath or any other complaints. Nursing notes were reviewed.     REVIEW OFSYSTEMS    (2-9 systems for level 4, 10 or more for level 5)   ROS:  General:  No fevers, no chills, no weakness  Cardiovascular:  No chest pain, no palpitations  Respiratory:  No shortness of breath, no cough, no wheezing  Gastrointestinal:  No pain, no nausea, no vomiting, no diarrhea  Musculoskeletal: Bilateral lower extremity Emotionally abused: None     Physically abused: None     Forced sexual activity: None   Other Topics Concern    None   Social History Narrative    None         PHYSICAL EXAM    (up to 7 for level 4, 8 or more for level 5)     ED Triage Vitals [09/17/20 0059]   BP Temp Temp Source Pulse Resp SpO2 Height Weight   113/77 98.1 °F (36.7 °C) Oral 84 18 100 % 5' 6\" (1.676 m) 130 lb (59 kg)       Physical Exam  General :Patient is awake, alert, oriented, in no acute distress, nontoxic appearing  HEENT: Pupils are equally round and reactive to light, EOMI, conjunctivae clear. Oral mucosa is moist, no exudate. Uvula is midline  Neck: Neck is supple, full range of motion, trachea midline  Cardiac: Heart regular rate, rhythm, no murmurs, rubs, or gallops  Lungs: Lungs are clear to auscultation, there is no wheezing, rhonchi, or rales. There is no use of accessory muscles. Chest wall: There is no tenderness to palpation over the chest wall or over ribs  Abdomen: Abdomen is soft, nontender, nondistended. There is no firm or pulsatile masses, no rebound rigidity or guarding. Musculoskeletal: 5 out of 5 strength in all 4 extremities. No focal muscle deficits are appreciated  Bilateral lower extremity= 2+ pitting edema bilateral lower extremities right greater than left. Positive Homans sign right lower extremity. Patient has 5 out of 5 muscle 2+ reflexes neurovascularly intact less than 2-second capillary refill. Resolving cellulitis right lower extremity  Neuro: Motor intact, sensory intact, level of consciousness is normal, cerebellar function is normal, reflexes are grossly normal. No evidence of incontinence or loss of bowel or bladder function, no saddle anesthesia noted   Dermatology: Resolving cellulitis right lower extremity  Psych: Mentation is grossly normal, cognition is grossly normal. Affect is appropriate.       DIAGNOSTIC RESULTS     EKG: All EKG's are interpreted by the Emergency Department Physician who either signs or Co-signs this chart in the absenceof a cardiologist.    The EKG interpreted by me shows normal sinus rhythm rate of 90 per EDMD    RADIOLOGY:   Non-plain film images such as CT, Ultrasound and MRI are read by the radiologist. Plain radiographic images are visualized and preliminarily interpreted by the emergency physician with the below findings:      ? Radiologist's Report Reviewed:  XR CHEST PORTABLE   Final Result     Normal chest x-ray. US DUP LOWER EXTREMITIES BILATERAL VENOUS   Final Result     No DVT.                 ED BEDSIDE ULTRASOUND:   Performed by ED Physician - none    LABS:    I have reviewed and interpreted all of the currently available lab results from this visit (ifapplicable):  Results for orders placed or performed during the hospital encounter of 09/17/20   CBC Auto Differential   Result Value Ref Range    WBC 8.0 4.0 - 11.0 K/uL    RBC 4.27 3.80 - 5.80 M/uL    Hemoglobin 12.6 11.5 - 16.5 g/dL    Hematocrit 38.3 37.0 - 47.0 %    MCV 89.7 80.0 - 100.0 fL    MCH 29.5 27.0 - 32.0 pg    MCHC 32.9 31.0 - 35.0 g/dL    RDW 12.8 11.0 - 16.0 %    Platelets 588 674 - 936 K/uL    MPV 11.1 (H) 6.0 - 10.0 fL    Neutrophils % 72.7 %    Immature Granulocytes % 0.6 0.0 - 5.0 %    Lymphocytes % 17.0 %    Monocytes % 8.2 %    Eosinophils % 1.0 %    Basophils % 0.5 %    Neutrophils Absolute 5.8 2.0 - 7.5 K/uL    Immature Granulocytes # 0.1 K/uL    Lymphocytes Absolute 1.4 (L) 1.5 - 4.0 K/uL    Monocytes Absolute 0.7 0.2 - 0.8 K/uL    Eosinophils Absolute 0.1 0.0 - 0.4 K/uL    Basophils Absolute 0.0 0.0 - 0.1 K/uL   Comprehensive Metabolic Panel w/ Reflex to MG   Result Value Ref Range    Sodium 132 (L) 136 - 145 mmol/L    Potassium reflex Magnesium 3.2 (L) 3.4 - 5.1 mmol/L    Chloride 95 (L) 98 - 107 mmol/L    CO2 27 20 - 30 mmol/L    Anion Gap 10 3 - 16    Glucose 99 74 - 106 mg/dL    BUN 7 6 - 20 mg/dL    CREATININE 0.6 0.4 - 1.2 mg/dL    GFR Non-African American >60 >59    GFR  American >59 >59    Calcium 9.3 8.5 - 10.5 mg/dL    Total Protein 7.4 6.4 - 8.3 g/dL    Alb 3.9 3.4 - 4.8 g/dL    Albumin/Globulin Ratio 1.1 0.8 - 2.0    Total Bilirubin 0.6 0.3 - 1.2 mg/dL    Alkaline Phosphatase 88 25 - 100 U/L    ALT 20 4 - 36 U/L    AST 24 8 - 33 U/L    Globulin 3.5 g/dL   Lipase   Result Value Ref Range    Lipase 28.0 5.6 - 51.3 U/L   Troponin   Result Value Ref Range    Troponin <0.30 <0.30 ng/mL   Brain Natriuretic Peptide   Result Value Ref Range    Pro- 0 - 900 pg/mL   Urine Reflex to Culture    Specimen: Urine, clean catch   Result Value Ref Range    Color, UA Yellow Straw/Yellow    Clarity, UA Clear Clear    Glucose, Ur Negative Negative mg/dL    Bilirubin Urine Negative Negative    Ketones, Urine Negative Negative mg/dL    Specific Gravity, UA 1.015 1.005 - 1.030    Blood, Urine Negative Negative    pH, UA 7.0 5.0 - 8.0    Protein, UA Negative Negative mg/dL    Urobilinogen, Urine 0.2 <2.0 E.U./dL    Nitrite, Urine Negative Negative    Leukocyte Esterase, Urine Negative Negative    Microscopic Examination Not Indicated     Urine Type NotGiven     Urine Reflex to Culture Not Indicated    Magnesium   Result Value Ref Range    Magnesium 2.1 1.7 - 2.4 mg/dL        All other labs were within normal range or not returned as of this dictation. EMERGENCY DEPARTMENT COURSE and DIFFERENTIAL DIAGNOSIS/MDM:   Vitals:    Vitals:    09/17/20 0059   BP: 113/77   Pulse: 84   Resp: 18   Temp: 98.1 °F (36.7 °C)   TempSrc: Oral   SpO2: 100%   Weight: 130 lb (59 kg)   Height: 5' 6\" (1.676 m)       MEDICATIONS ADMINISTERED IN ED:  Medications   potassium chloride (KLOR-CON M) extended release tablet 40 mEq (has no administration in time range)   furosemide (LASIX) injection 60 mg (60 mg Intravenous Given 9/17/20 0130)       Patient was placed examination room at which time after exam was performed IV was obtained and labs are drawn. Patient was given 60 mg of Lasix IV.   Patient was taken over for

## 2020-09-22 ENCOUNTER — HOSPITAL ENCOUNTER (OUTPATIENT)
Facility: HOSPITAL | Age: 40
Discharge: HOME | End: 2020-09-22
Payer: COMMERCIAL

## 2020-09-22 LAB
A/G RATIO: 1.4 (ref 0.8–2)
ALBUMIN SERPL-MCNC: 3.9 G/DL (ref 3.4–4.8)
ALP BLD-CCNC: 78 U/L (ref 25–100)
ALT SERPL-CCNC: 22 U/L (ref 4–36)
ANION GAP SERPL CALCULATED.3IONS-SCNC: 14 MMOL/L (ref 3–16)
AST SERPL-CCNC: 24 U/L (ref 8–33)
BASOPHILS ABSOLUTE: 0 K/UL (ref 0–0.1)
BASOPHILS RELATIVE PERCENT: 0.6 %
BILIRUB SERPL-MCNC: 0.4 MG/DL (ref 0.3–1.2)
BUN BLDV-MCNC: 5 MG/DL (ref 6–20)
CALCIUM SERPL-MCNC: 8.9 MG/DL (ref 8.5–10.5)
CHLORIDE BLD-SCNC: 104 MMOL/L (ref 98–107)
CHOLESTEROL, TOTAL: 146 MG/DL (ref 0–200)
CO2: 24 MMOL/L (ref 20–30)
CREAT SERPL-MCNC: 0.6 MG/DL (ref 0.4–1.2)
EOSINOPHILS ABSOLUTE: 0.1 K/UL (ref 0–0.4)
EOSINOPHILS RELATIVE PERCENT: 2.4 %
FOLATE: 12.7 NG/ML
GFR AFRICAN AMERICAN: >59
GFR NON-AFRICAN AMERICAN: >60
GLOBULIN: 2.7 G/DL
GLUCOSE BLD-MCNC: 91 MG/DL (ref 74–106)
HCT VFR BLD CALC: 40.6 % (ref 37–47)
HDLC SERPL-MCNC: 49 MG/DL (ref 40–60)
HEMOGLOBIN: 12.8 G/DL (ref 11.5–16.5)
IMMATURE GRANULOCYTES #: 0 K/UL
IMMATURE GRANULOCYTES %: 0.6 % (ref 0–5)
LDL CHOLESTEROL CALCULATED: 83 MG/DL
LYMPHOCYTES ABSOLUTE: 1.2 K/UL (ref 1.5–4)
LYMPHOCYTES RELATIVE PERCENT: 23.4 %
MCH RBC QN AUTO: 29.2 PG (ref 27–32)
MCHC RBC AUTO-ENTMCNC: 31.5 G/DL (ref 31–35)
MCV RBC AUTO: 92.7 FL (ref 80–100)
MONOCYTES ABSOLUTE: 0.4 K/UL (ref 0.2–0.8)
MONOCYTES RELATIVE PERCENT: 7.4 %
NEUTROPHILS ABSOLUTE: 3.3 K/UL (ref 2–7.5)
NEUTROPHILS RELATIVE PERCENT: 65.6 %
PDW BLD-RTO: 13.1 % (ref 11–16)
PLATELET # BLD: 269 K/UL (ref 150–400)
PMV BLD AUTO: 11.7 FL (ref 6–10)
POTASSIUM SERPL-SCNC: 3.5 MMOL/L (ref 3.4–5.1)
RBC # BLD: 4.38 M/UL (ref 3.8–5.8)
SODIUM BLD-SCNC: 142 MMOL/L (ref 136–145)
TOTAL PROTEIN: 6.6 G/DL (ref 6.4–8.3)
TRIGL SERPL-MCNC: 71 MG/DL (ref 0–249)
TSH SERPL DL<=0.05 MIU/L-ACNC: 1.29 UIU/ML (ref 0.35–5.5)
VITAMIN B-12: 431 PG/ML (ref 211–911)
VLDLC SERPL CALC-MCNC: 14 MG/DL
WBC # BLD: 5 K/UL (ref 4–11)

## 2020-09-22 PROCEDURE — 36415 COLL VENOUS BLD VENIPUNCTURE: CPT

## 2020-09-22 PROCEDURE — 84443 ASSAY THYROID STIM HORMONE: CPT

## 2020-09-22 PROCEDURE — 82607 VITAMIN B-12: CPT

## 2020-09-22 PROCEDURE — 82746 ASSAY OF FOLIC ACID SERUM: CPT

## 2020-09-22 PROCEDURE — 85025 COMPLETE CBC W/AUTO DIFF WBC: CPT

## 2020-09-22 PROCEDURE — 80053 COMPREHEN METABOLIC PANEL: CPT

## 2020-09-22 PROCEDURE — 80061 LIPID PANEL: CPT

## 2020-10-21 ENCOUNTER — CONSULT (OUTPATIENT)
Dept: CARDIOLOGY | Facility: CLINIC | Age: 40
End: 2020-10-21

## 2020-10-21 VITALS
SYSTOLIC BLOOD PRESSURE: 104 MMHG | HEART RATE: 83 BPM | WEIGHT: 143.5 LBS | HEIGHT: 66 IN | RESPIRATION RATE: 18 BRPM | OXYGEN SATURATION: 95 % | BODY MASS INDEX: 23.06 KG/M2 | DIASTOLIC BLOOD PRESSURE: 70 MMHG

## 2020-10-21 DIAGNOSIS — R60.0 BILATERAL LEG EDEMA: Primary | ICD-10-CM

## 2020-10-21 PROCEDURE — 99243 OFF/OP CNSLTJ NEW/EST LOW 30: CPT | Performed by: INTERNAL MEDICINE

## 2020-10-21 RX ORDER — FUROSEMIDE 20 MG/1
TABLET ORAL DAILY PRN
COMMUNITY
Start: 2020-09-22

## 2020-10-21 RX ORDER — POTASSIUM CHLORIDE 750 MG/1
TABLET, FILM COATED, EXTENDED RELEASE ORAL DAILY PRN
COMMUNITY
Start: 2020-09-22

## 2020-10-21 NOTE — PROGRESS NOTES
"    Subjective:     Encounter Date:10/21/2020      Patient ID: Elysia Cartagena is a 40 y.o. female.    Chief Complaint: Swelling  HPI  This is a 40-year-old female patient who presents to cardiology clinic for evaluation of bilateral lower extremity edema.  The patient indicates this occurred 1 month ago after she was bit on the arm by \"bug\" that she thinks was a mosquito.  The patient has been seen in the emergency room on multiple occasions for quite substantial pitting edema.  The patient's basic laboratory data shows no evidence of renal failure, nephrotic syndrome or cirrhosis.  There is no evidence of protein malnutrition and hypoalbuminemia.  The patient's chest x-ray has shown no evidence of congestive heart failure.  Twelve-lead electrocardiogram is normal.  Cardiac troponins and BNP have been serially normal on multiple occasions.  The patient has undergone bilateral lower extremity venous compression ultrasound with duplex Doppler showing no evidence of DVT.  The patient has no prior history of DVT or pulmonary embolus.  She is a non-smoker.  She has no personal history of hypertension diabetes or elevated cholesterol.  She has no chest discomfort at rest or with activity.  There is no exertional chest arm neck jaw shoulder back discomfort.  There is no orthopnea or PND.  She has no dizziness palpitations or syncope.  The patient's peripheral edema has responded to IV loop diuretics given in the emergency room with significant diuresis.  The patient is currently taking low-dose oral Lasix with potassium supplementation and has experienced improvement.  The patient indicates that her peripheral edema is worse as the day progresses and is of maximal severity in the evening before she goes to bed.  She has had discomfort from the swelling.  Both the swelling and discomfort improved significantly overnight with recumbency.  The following portions of the patient's history were reviewed and updated as " appropriate: allergies, current medications, past family history, past medical history, past social history, past surgical history and problem  Review of Systems   Constitution: Negative for chills, diaphoresis, fever, malaise/fatigue, weight gain and weight loss.   HENT: Negative for ear discharge, hearing loss, hoarse voice and nosebleeds.    Eyes: Negative for discharge, double vision, pain and photophobia.   Cardiovascular: Negative for chest pain, claudication, cyanosis, dyspnea on exertion, irregular heartbeat, leg swelling, near-syncope, orthopnea, palpitations, paroxysmal nocturnal dyspnea and syncope.   Respiratory: Negative for cough, hemoptysis, shortness of breath, sputum production and wheezing.    Endocrine: Negative for cold intolerance, heat intolerance, polydipsia, polyphagia and polyuria.   Hematologic/Lymphatic: Negative for adenopathy and bleeding problem. Does not bruise/bleed easily.   Skin: Negative for color change, flushing, itching and rash.   Musculoskeletal: Negative for muscle cramps, muscle weakness, myalgias and stiffness.   Gastrointestinal: Negative for abdominal pain, diarrhea, hematemesis, hematochezia, nausea and vomiting.   Genitourinary: Negative for dysuria, frequency and nocturia.   Neurological: Negative for focal weakness, loss of balance, numbness, paresthesias and seizures.   Psychiatric/Behavioral: Negative for altered mental status, hallucinations and suicidal ideas.   Allergic/Immunologic: Negative for HIV exposure, hives and persistent infections.           Current Outpatient Medications:   •  furosemide (LASIX) 20 MG tablet, Daily As Needed., Disp: , Rfl:   •  potassium chloride 10 MEQ CR tablet, Daily As Needed., Disp: , Rfl:     Objective:   Vitals signs and nursing note reviewed.   Cardiovascular:      PMI at left midclavicular line. Normal rate. Regular rhythm. Normal S1. Normal S2.      Murmurs: There is no murmur.      No gallop. No click. No rub.   Pulses:      "Intact distal pulses.   Edema:     Peripheral edema present.     Pretibial: bilateral 2+ pitting edema of the pretibial area.     Ankle: bilateral 2+ pitting edema of the ankle.      Blood pressure 104/70, pulse 83, resp. rate 18, height 167.6 cm (66\"), weight 65.1 kg (143 lb 8 oz), SpO2 95 %.   Lab Review:     Assessment:       1. Bilateral leg edema  There is no evidence of congestive heart failure clinically or by routine testing up to this point including a normal BNP and normal PA and lateral chest x-ray.  It is highly unlikely that this is a manifestation of advanced pulmonary hypertension with cor pulmonale and right heart failure as there are no other associated features such as typical changes on the twelve-lead electrocardiogram suggesting a pulmonary disease pattern or right ventricular hypertrophy.  In addition the BNP would be expected to be elevated in this scenario.  Moreover, the patient has no symptoms which would be associated with advanced pulmonary hypertension and right heart failure such as exertional dyspnea and syncope.  The patient's peripheral edema has an unusual pattern and that the toes and feet are essentially spared and the swelling begins at the ankle and extends up to the knee.  I do not think there is any correlation between the patient having an insect bite on her arm that would cause swelling of her lower extremities.  Basic laboratory data shows no evidence of nephrotic syndrome, cirrhosis with ascites and pulmonary hypertension or hypoalbuminemic protein malnutrition.  Physical examination does not suggest venous insufficiency or the presence of superficial or deep varicose veins.  There is no \"peau d'orange\" dermatologic changes or evidence of chronic venous stasis dermopathy.  The patient has no history of recurrent cellulitis or lymphangitis.  There is no evidence of prior deep vein thrombosis and ultrasound based imaging shows no evidence of DVT or superficial venous " "thrombosis.    Procedures    Plan:     At this point I would not recommend escalation of diuretic therapy as the natural history of this process is well described and will invariably lead to intravascular volume depletion, dehydration with orthostatic symptoms as well as prerenal azotemia and electrolyte imbalance.  The patient has been advised to discontinue all forms of nonsteroidal anti-inflammatory medications.  She is instructed to only use over-the-counter Tylenol as instructed for \"aches and pains\".  The patient indicates that she only intermittently uses ibuprofen for chronic back pain.  The patient has been counseled regarding the essential need for fluid and sodium restriction.  The patient has been educated that diuretic therapy does not work except in the context of fluid and sodium restriction.  I have recommended a 1.5 L/day fluid restriction and a less than 1500 mg/day sodium restriction.  The patient has been counseled and educated regarding foods and beverages that are high in sodium content with instructions to avoid these.  We are making arrangements for the patient to meet with an outpatient dietitian to discuss strategies to maintain these restrictions at home.  The patient has been counseled to avoid prolonged standing or sitting with her legs in a \"dangling\" position.  The patient has been advised that for every hour that she is on her feet or sitting with her legs \"dangling\",  She needs to spend 1 hour in a recliner with her ankles elevated above the level of her heart.  The patient has been counseled to avoid prednisone and other steroid-based anti-inflammatory medications as these \"steroids\" will lead to dramatic worsening of her peripheral edema.  There is no evidence that her isolated swelling of her lower extremities sparing her upper extremities and face has any relationship to a \"allergic reaction\".  Patient has been educated that typical allergic reactions would have been associated " "with hives and other symptoms and exam findings.  The patient can safely use over-the-counter Benadryl as necessary for other \"seasonal\" allergies.  I have recommended Job stockings 20-30 mm of compression \"to-the-knee\" model.  The patient has been advised that these need to be placed first thing in the morning and should be worn throughout the entire course of the day and only removed at night when she is laying down for sleep.  The patient has been educated that these are extremely uncomfortable to wear and are difficult to \"put on and take off\".  The patient has been advised that these challenges must be overcome as this is the only effective therapy to favorably impact her peripheral edema.  The patient has been advised that noncompliance with this line of therapy will almost certainly lead to worsening of the underlying pathology.      "

## 2023-08-24 ENCOUNTER — HOSPITAL ENCOUNTER (OUTPATIENT)
Facility: HOSPITAL | Age: 43
Discharge: HOME | End: 2023-08-24
Payer: COMMERCIAL

## 2023-08-24 LAB
25(OH)D3 SERPL-MCNC: 35.3 NG/ML (ref 32–100)
ALBUMIN SERPL-MCNC: 4.5 G/DL (ref 3.4–4.8)
ALBUMIN/GLOB SERPL: 2 {RATIO} (ref 0.8–2)
ALP SERPL-CCNC: 56 U/L (ref 25–100)
ALT SERPL-CCNC: 13 U/L (ref 4–36)
ANION GAP SERPL CALCULATED.3IONS-SCNC: 15 MMOL/L (ref 3–16)
AST SERPL-CCNC: 21 U/L (ref 8–33)
BASOPHILS # BLD: 0 K/UL (ref 0–0.1)
BASOPHILS NFR BLD: 0.8 %
BILIRUB SERPL-MCNC: 0.7 MG/DL (ref 0.3–1.2)
BUN SERPL-MCNC: 9 MG/DL (ref 6–20)
CALCIUM SERPL-MCNC: 9.2 MG/DL (ref 8.5–10.5)
CHLORIDE SERPL-SCNC: 105 MMOL/L (ref 98–107)
CHOLEST SERPL-MCNC: 139 MG/DL (ref 0–200)
CO2 SERPL-SCNC: 23 MMOL/L (ref 20–30)
CREAT SERPL-MCNC: 0.8 MG/DL (ref 0.4–1.2)
EOSINOPHIL # BLD: 0.2 K/UL (ref 0–0.4)
EOSINOPHIL NFR BLD: 4.2 %
ERYTHROCYTE [DISTWIDTH] IN BLOOD BY AUTOMATED COUNT: 12.7 % (ref 11–16)
FOLATE SERPL-MCNC: 15.56 NG/ML
GFR SERPLBLD CREATININE-BSD FMLA CKD-EPI: >60 ML/MIN/{1.73_M2}
GLOBULIN SER CALC-MCNC: 2.3 G/DL
GLUCOSE SERPL-MCNC: 79 MG/DL (ref 74–106)
HBA1C MFR BLD: 4.8 %
HCT VFR BLD AUTO: 44.4 % (ref 37–47)
HDLC SERPL-MCNC: 50 MG/DL (ref 40–60)
HGB BLD-MCNC: 14.7 G/DL (ref 11.5–16.5)
IMM GRANULOCYTES # BLD: 0 K/UL
IMM GRANULOCYTES NFR BLD: 0 % (ref 0–5)
LDLC SERPL CALC-MCNC: 72 MG/DL
LYMPHOCYTES # BLD: 1.2 K/UL (ref 1.5–4)
LYMPHOCYTES NFR BLD: 32.3 %
MCH RBC QN AUTO: 29.8 PG (ref 27–32)
MCHC RBC AUTO-ENTMCNC: 33.1 G/DL (ref 31–35)
MCV RBC AUTO: 90.1 FL (ref 80–100)
MONOCYTES # BLD: 0.2 K/UL (ref 0.2–0.8)
MONOCYTES NFR BLD: 6.7 %
NEUTROPHILS # BLD: 2 K/UL (ref 2–7.5)
NEUTS SEG NFR BLD: 56 %
PLATELET # BLD AUTO: 204 K/UL (ref 150–400)
PMV BLD AUTO: 12.7 FL (ref 6–10)
POTASSIUM SERPL-SCNC: 3.9 MMOL/L (ref 3.4–5.1)
PROT SERPL-MCNC: 6.8 G/DL (ref 6.4–8.3)
RBC # BLD AUTO: 4.93 M/UL (ref 3.8–5.8)
SODIUM SERPL-SCNC: 143 MMOL/L (ref 136–145)
TRIGL SERPL-MCNC: 85 MG/DL (ref 0–249)
TSH SERPL DL<=0.005 MIU/L-ACNC: 1.23 UIU/ML (ref 0.27–4.2)
VIT B12 SERPL-MCNC: 536 PG/ML (ref 211–911)
VLDLC SERPL CALC-MCNC: 17 MG/DL
WBC # BLD AUTO: 3.6 K/UL (ref 4–11)

## 2023-08-24 PROCEDURE — 85025 COMPLETE CBC W/AUTO DIFF WBC: CPT

## 2023-08-24 PROCEDURE — 83036 HEMOGLOBIN GLYCOSYLATED A1C: CPT

## 2023-08-24 PROCEDURE — 82746 ASSAY OF FOLIC ACID SERUM: CPT

## 2023-08-24 PROCEDURE — 80061 LIPID PANEL: CPT

## 2023-08-24 PROCEDURE — 84443 ASSAY THYROID STIM HORMONE: CPT

## 2023-08-24 PROCEDURE — 80053 COMPREHEN METABOLIC PANEL: CPT

## 2023-08-24 PROCEDURE — 82306 VITAMIN D 25 HYDROXY: CPT

## 2023-08-24 PROCEDURE — 36415 COLL VENOUS BLD VENIPUNCTURE: CPT

## 2023-08-24 PROCEDURE — 82607 VITAMIN B-12: CPT

## 2023-10-04 NOTE — PROGRESS NOTES
Patient: Elysia Cartagena    YOB: 1980    Date: 10/05/2023    Primary Care Provider: Lizette Zhou APRN    Chief Complaint   Patient presents with    Colonoscopy       SUBJECTIVE:    History of present illness:  I saw the patient in the office today as a consultation for evaluation for a colonoscopy.  She states she has had abnormal weight loss for the past 2 months. Denies rectal bleeding or abdominal pain. No diarrhea or constipation.  Patient had occasional vaginal bleeding but no evidence of rectal bleeding.  No family Struve colon cancer.  No loss of appetite or nausea vomiting.    The following portions of the patient's history were reviewed and updated as appropriate: allergies, current medications, past family history, past medical history, past social history, past surgical history and problem list.    Review of Systems   Constitutional:  Positive for unexpected weight change. Negative for chills and fever.   HENT:  Negative for hearing loss, trouble swallowing and voice change.    Eyes:  Negative for visual disturbance.   Respiratory:  Negative for apnea, cough, chest tightness, shortness of breath and wheezing.    Cardiovascular:  Negative for chest pain, palpitations and leg swelling.   Gastrointestinal:  Negative for abdominal distention, abdominal pain, anal bleeding, blood in stool, constipation, diarrhea, nausea, rectal pain and vomiting.   Endocrine: Negative for cold intolerance and heat intolerance.   Genitourinary:  Negative for difficulty urinating, dysuria and flank pain.   Musculoskeletal:  Negative for back pain and gait problem.   Skin:  Negative for color change, rash and wound.   Neurological:  Negative for dizziness, syncope, speech difficulty, weakness, light-headedness, numbness and headaches.   Hematological:  Negative for adenopathy. Does not bruise/bleed easily.   Psychiatric/Behavioral:  Negative for confusion. The patient is not nervous/anxious.   "    History:  History reviewed. No pertinent past medical history.    Past Surgical History:   Procedure Laterality Date    EAR TUBES Left        Family History   Problem Relation Age of Onset    Stomach cancer Mother     Hypertension Father     Hypertension Brother        Social History     Tobacco Use    Smoking status: Never    Smokeless tobacco: Never   Substance Use Topics    Alcohol use: Never    Drug use: Never       Medications:   Current Outpatient Medications:     Elastic Bandages & Supports (Jobst Active 20-30mmHg Medium) misc, 2 Units Daily., Disp: 2 each, Rfl: 4    furosemide (LASIX) 20 MG tablet, Daily As Needed., Disp: , Rfl:     potassium chloride 10 MEQ CR tablet, Daily As Needed., Disp: , Rfl:        Allergies: No Known Allergies    OBJECTIVE:    Vital Signs:  Vitals:    10/05/23 1019   BP: 136/70   Pulse: 86   SpO2: 98%   Weight: 64.9 kg (143 lb)   Height: 167.6 cm (66\")       Physical Exam:   General Appearance:    Alert, cooperative, in no acute distress   Head:    Normocephalic, without obvious abnormality, atraumatic   Eyes:            Lids and lashes normal, conjunctivae and sclerae normal, no   icterus, no pallor, corneas clear, PERRL   Ears:    Ears appear intact with no abnormalities noted   Throat:   No oral lesions, no thrush, oral mucosa moist   Neck:   No adenopathy, supple, trachea midline, no thyromegaly,  no JVD   Lungs:     Clear to auscultation,respirations regular, even and                  unlabored    Heart:    Regular rhythm and normal rate, normal S1 and S2, no            murmur   Abdomen:     no masses, no organomegaly, soft non-tender, non-distended, no guarding.  No abdominal wall tenderness or abdominal wall hernias.   Extremities:   Moves all extremities well, no edema, no cyanosis, no             redness   Pulses:   Pulses palpable and equal bilaterally   Skin:   No bleeding, bruising or rash   Lymph nodes:   No palpable adenopathy   Neurologic:   Cranial nerves 2 - 12 " grossly intact, sensation intact  Psychiatric: No evidence of depression or anxiety   Results Review:   I reviewed the patient's new clinical results.    Review of Systems was reviewed and confirmed as accurate as documented by the MA.    ASSESSMENT/PLAN:    1. Screening for colon cancer    2. Vaginal bleeding    3. Weight loss, abnormal        Patient reassured, no evidence of rectal bleeding symptoms.  Patient is 43 years of age, not hold off yet for a screening colonoscopy  Recommend follow-up in 4 years for colonoscopy.  Continue work-up for weight loss with primary care, may consider CT scan and further work-up with EGD if she develops symptoms.  Otherwise follow-up as needed.     Electronically signed by Chelsi Maza MD  10/05/23  13:53 EDT

## 2023-10-05 ENCOUNTER — OFFICE VISIT (OUTPATIENT)
Dept: SURGERY | Facility: CLINIC | Age: 43
End: 2023-10-05
Payer: COMMERCIAL

## 2023-10-05 VITALS
OXYGEN SATURATION: 98 % | HEIGHT: 66 IN | BODY MASS INDEX: 22.98 KG/M2 | HEART RATE: 86 BPM | WEIGHT: 143 LBS | SYSTOLIC BLOOD PRESSURE: 136 MMHG | DIASTOLIC BLOOD PRESSURE: 70 MMHG

## 2023-10-05 DIAGNOSIS — R63.4 WEIGHT LOSS, ABNORMAL: ICD-10-CM

## 2023-10-05 DIAGNOSIS — N93.9 VAGINAL BLEEDING: ICD-10-CM

## 2023-10-05 DIAGNOSIS — Z12.11 SCREENING FOR COLON CANCER: Primary | ICD-10-CM

## 2023-10-05 PROCEDURE — 1160F RVW MEDS BY RX/DR IN RCRD: CPT | Performed by: SURGERY

## 2023-10-05 PROCEDURE — 99203 OFFICE O/P NEW LOW 30 MIN: CPT | Performed by: SURGERY

## 2023-10-05 PROCEDURE — 1159F MED LIST DOCD IN RCRD: CPT | Performed by: SURGERY

## 2025-02-14 ENCOUNTER — HOSPITAL ENCOUNTER (OUTPATIENT)
Facility: HOSPITAL | Age: 45
Discharge: HOME | End: 2025-02-14
Payer: COMMERCIAL

## 2025-02-14 LAB
25(OH)D3 SERPL-MCNC: 15.5 NG/ML (ref 32–100)
ALBUMIN SERPL-MCNC: 4.1 G/DL (ref 3.4–4.8)
ALBUMIN/GLOB SERPL: 1.5 {RATIO} (ref 0.8–2)
ALP SERPL-CCNC: 64 U/L (ref 25–100)
ALT SERPL-CCNC: 24 U/L (ref 4–36)
ANION GAP SERPL CALCULATED.3IONS-SCNC: 11 MMOL/L (ref 3–16)
AST SERPL-CCNC: 25 U/L (ref 8–33)
BASOPHILS # BLD: 0.1 K/UL (ref 0–0.1)
BASOPHILS NFR BLD: 1.2 %
BILIRUB SERPL-MCNC: 0.4 MG/DL (ref 0.3–1.2)
BUN SERPL-MCNC: 8 MG/DL (ref 6–20)
CALCIUM SERPL-MCNC: 9.3 MG/DL (ref 8.3–10.6)
CHLORIDE SERPL-SCNC: 103 MMOL/L (ref 98–107)
CHOLEST SERPL-MCNC: 158 MG/DL (ref 0–200)
CO2 SERPL-SCNC: 26 MMOL/L (ref 20–30)
CREAT SERPL-MCNC: 0.6 MG/DL (ref 0.4–1.2)
EOSINOPHIL # BLD: 0.1 K/UL (ref 0–0.4)
EOSINOPHIL NFR BLD: 2.4 %
ERYTHROCYTE [DISTWIDTH] IN BLOOD BY AUTOMATED COUNT: 12.3 % (ref 11–16)
FOLATE SERPL-MCNC: 12.6 NG/ML
GFR SERPLBLD CREATININE-BSD FMLA CKD-EPI: >90 ML/MIN/{1.73_M2}
GLOBULIN SER CALC-MCNC: 2.8 G/DL
GLUCOSE SERPL-MCNC: 76 MG/DL (ref 74–106)
HBA1C MFR BLD: 4.8 %
HCT VFR BLD AUTO: 46.9 % (ref 37–47)
HDLC SERPL-MCNC: 68 MG/DL (ref 40–60)
HGB BLD-MCNC: 15.4 G/DL (ref 11.5–16.5)
IMM GRANULOCYTES # BLD: 0 K/UL
IMM GRANULOCYTES NFR BLD: 0.2 % (ref 0–5)
LDLC SERPL CALC-MCNC: 76 MG/DL
LYMPHOCYTES # BLD: 1.2 K/UL (ref 1.5–4)
LYMPHOCYTES NFR BLD: 24.2 %
MCH RBC QN AUTO: 29.3 PG (ref 27–32)
MCHC RBC AUTO-ENTMCNC: 32.8 G/DL (ref 31–35)
MCV RBC AUTO: 89.3 FL (ref 80–100)
MONOCYTES # BLD: 0.3 K/UL (ref 0.2–0.8)
MONOCYTES NFR BLD: 6.9 %
NEUTROPHILS # BLD: 3.2 K/UL (ref 2–7.5)
NEUTS SEG NFR BLD: 65.1 %
PLATELET # BLD AUTO: 267 K/UL (ref 150–400)
PMV BLD AUTO: 11.4 FL (ref 6–10)
POTASSIUM SERPL-SCNC: 4.2 MMOL/L (ref 3.4–5.1)
PROT SERPL-MCNC: 6.9 G/DL (ref 6.4–8.3)
RBC # BLD AUTO: 5.25 M/UL (ref 3.8–5.8)
SODIUM SERPL-SCNC: 140 MMOL/L (ref 136–145)
TRIGL SERPL-MCNC: 71 MG/DL (ref 0–249)
TSH SERPL DL<=0.005 MIU/L-ACNC: 1.35 UIU/ML (ref 0.27–4.2)
VIT B12 SERPL-MCNC: 353 PG/ML (ref 211–911)
VLDLC SERPL CALC-MCNC: 14 MG/DL
WBC # BLD AUTO: 4.9 K/UL (ref 4–11)

## 2025-02-14 PROCEDURE — 83036 HEMOGLOBIN GLYCOSYLATED A1C: CPT

## 2025-02-14 PROCEDURE — 80061 LIPID PANEL: CPT

## 2025-02-14 PROCEDURE — 36415 COLL VENOUS BLD VENIPUNCTURE: CPT

## 2025-02-14 PROCEDURE — 80053 COMPREHEN METABOLIC PANEL: CPT

## 2025-02-14 PROCEDURE — 84443 ASSAY THYROID STIM HORMONE: CPT

## 2025-02-14 PROCEDURE — 82306 VITAMIN D 25 HYDROXY: CPT

## 2025-02-14 PROCEDURE — 85025 COMPLETE CBC W/AUTO DIFF WBC: CPT

## 2025-02-14 PROCEDURE — 82607 VITAMIN B-12: CPT

## 2025-02-14 PROCEDURE — 82746 ASSAY OF FOLIC ACID SERUM: CPT
